# Patient Record
Sex: FEMALE | Race: OTHER | HISPANIC OR LATINO | Employment: FULL TIME | URBAN - NONMETROPOLITAN AREA
[De-identification: names, ages, dates, MRNs, and addresses within clinical notes are randomized per-mention and may not be internally consistent; named-entity substitution may affect disease eponyms.]

---

## 2024-08-02 ENCOUNTER — OFFICE VISIT (OUTPATIENT)
Dept: URGENT CARE | Facility: CLINIC | Age: 29
End: 2024-08-02
Payer: COMMERCIAL

## 2024-08-02 ENCOUNTER — HOSPITAL ENCOUNTER (OUTPATIENT)
Facility: MEDICAL CENTER | Age: 29
End: 2024-08-02
Attending: PHYSICIAN ASSISTANT
Payer: COMMERCIAL

## 2024-08-02 VITALS
DIASTOLIC BLOOD PRESSURE: 52 MMHG | HEIGHT: 65 IN | HEART RATE: 71 BPM | OXYGEN SATURATION: 99 % | BODY MASS INDEX: 26.49 KG/M2 | WEIGHT: 159 LBS | RESPIRATION RATE: 16 BRPM | TEMPERATURE: 98.2 F | SYSTOLIC BLOOD PRESSURE: 116 MMHG

## 2024-08-02 DIAGNOSIS — R10.2 PELVIC PAIN: ICD-10-CM

## 2024-08-02 LAB
APPEARANCE UR: CLEAR
BILIRUB UR STRIP-MCNC: NEGATIVE MG/DL
COLOR UR AUTO: YELLOW
GLUCOSE UR STRIP.AUTO-MCNC: NEGATIVE MG/DL
KETONES UR STRIP.AUTO-MCNC: NEGATIVE MG/DL
LEUKOCYTE ESTERASE UR QL STRIP.AUTO: NEGATIVE
NITRITE UR QL STRIP.AUTO: NEGATIVE
PH UR STRIP.AUTO: 7 [PH] (ref 5–8)
POCT INT CON NEG: NEGATIVE
POCT INT CON POS: POSITIVE
POCT URINE PREGNANCY TEST: NEGATIVE
PROT UR QL STRIP: NEGATIVE MG/DL
RBC UR QL AUTO: NEGATIVE
SP GR UR STRIP.AUTO: 1.01
UROBILINOGEN UR STRIP-MCNC: 0.2 MG/DL

## 2024-08-02 PROCEDURE — 87086 URINE CULTURE/COLONY COUNT: CPT

## 2024-08-02 PROCEDURE — 81002 URINALYSIS NONAUTO W/O SCOPE: CPT | Performed by: PHYSICIAN ASSISTANT

## 2024-08-02 PROCEDURE — 3078F DIAST BP <80 MM HG: CPT | Performed by: PHYSICIAN ASSISTANT

## 2024-08-02 PROCEDURE — 3074F SYST BP LT 130 MM HG: CPT | Performed by: PHYSICIAN ASSISTANT

## 2024-08-02 PROCEDURE — 81025 URINE PREGNANCY TEST: CPT | Performed by: PHYSICIAN ASSISTANT

## 2024-08-02 PROCEDURE — 99213 OFFICE O/P EST LOW 20 MIN: CPT | Performed by: PHYSICIAN ASSISTANT

## 2024-08-02 ASSESSMENT — FIBROSIS 4 INDEX: FIB4 SCORE: 1.04

## 2024-08-02 ASSESSMENT — ENCOUNTER SYMPTOMS
BACK PAIN: 0
NAUSEA: 0
ANOREXIA: 0
DIZZINESS: 0
FLANK PAIN: 0
VOMITING: 0
CHILLS: 0
FEVER: 0
ABDOMINAL PAIN: 1

## 2024-08-02 NOTE — PROGRESS NOTES
Subjective     Valentina WEBB is a 29 y.o. female who presents with UTI (Patient coming in for possible urinary infection)            Pelvic Pain  This is a new problem. Episode onset: 2-3 days. The problem occurs intermittently. The problem has been waxing and waning. Associated symptoms include abdominal pain. Pertinent negatives include no anorexia, chills, fever, nausea, rash, urinary symptoms or vomiting. Associated symptoms comments: Pelvic pain. Nothing aggravates the symptoms. She has tried nothing for the symptoms.     Patient is concerned a possible UTI may be starting. Symptoms from a previous UTI were similar in the past.    Past Medical History:   Diagnosis Date    Elevated sed rate 9/29/2014    Headache(784.0)     Hyperproteinemia 9/29/2014    Hypokalemia 9/29/2014    Hypothyroidism     TSH >300 (9/29/2014)    Menorrhagia 1/2013    Menorrhagia 1/1/2013    Migraine     Pain, joint, multiple sites     Positive NERI (antinuclear antibody) 9/29/2014    NERI 8 (9/29/2015)    Renal insufficiency 9/29/2014       Past Surgical History:   Procedure Laterality Date    [place] LAP,APPENDECTOMY N/A 11/28/2022    Procedure: APPENDECTOMY, LAPAROSCOPIC;  Surgeon: Imelda Garza M.D.;  Location: [facility];  Service: General    MIGEL BY LAPAROSCOPY N/A 02/17/2022    Procedure: LAPAROSCOPIC CHOLESTECTOMY;  Surgeon: Imelda Garza M.D.;  Location: [facility] [place];  Service: General    APPENDECTOMY         Family History   Problem Relation Age of Onset    Ovarian Cancer Mother     Diabetes Maternal Grandmother     Diabetes Maternal Grandfather     No Known Problems Daughter        Patient has no known allergies.    Medications, Allergies, and current problem list reviewed today in Epic    Review of Systems   Constitutional:  Negative for chills, fever and malaise/fatigue.   Gastrointestinal:  Positive for abdominal pain. Negative for anorexia, nausea and vomiting.   Genitourinary:  Positive for pelvic pain.  "Negative for dysuria, flank pain, frequency, hematuria and urgency.        Pelvic pain   Musculoskeletal:  Negative for back pain.   Skin:  Negative for rash.   Neurological:  Negative for dizziness.     All other systems reviewed and are negative.            Objective     /52   Pulse 71   Temp 36.8 °C (98.2 °F) (Temporal)   Resp 16   Ht 1.651 m (5' 5\")   Wt 72.1 kg (159 lb)   SpO2 99%   BMI 26.46 kg/m²      Physical Exam  Constitutional:       General: She is not in acute distress.     Appearance: She is not ill-appearing.   HENT:      Head: Normocephalic and atraumatic.   Eyes:      Conjunctiva/sclera: Conjunctivae normal.   Cardiovascular:      Rate and Rhythm: Normal rate and regular rhythm.   Pulmonary:      Effort: Pulmonary effort is normal. No respiratory distress.      Breath sounds: No stridor. No wheezing.   Abdominal:      General: There is no distension.      Palpations: Abdomen is soft. There is no mass.      Tenderness: There is abdominal tenderness (mild TTP RLQ, LLQ, suprapubic region. No rigidity or guarding.). There is no right CVA tenderness, left CVA tenderness, guarding or rebound. Negative signs include McBurney's sign.   Skin:     General: Skin is warm and dry.   Neurological:      General: No focal deficit present.      Mental Status: She is alert and oriented to person, place, and time.   Psychiatric:         Mood and Affect: Mood normal.         Behavior: Behavior normal.         Thought Content: Thought content normal.         Judgment: Judgment normal.            Lab Results   Component Value Date/Time    POCCOLOR yellow 08/02/2024 03:44 PM    POCAPPEAR clear 08/02/2024 03:44 PM    POCLEUKEST negative 08/02/2024 03:44 PM    POCNITRITE negative 08/02/2024 03:44 PM    POCUROBILIGE 0.2 08/02/2024 03:44 PM    POCPROTEIN negative 08/02/2024 03:44 PM    POCURPH 7.0 08/02/2024 03:44 PM    POCBLOOD negative 08/02/2024 03:44 PM    POCSPGRV 1.015 08/02/2024 03:44 PM    POCKETONES " negative 08/02/2024 03:44 PM    POCBILIRUBIN negative 08/02/2024 03:44 PM    POCGLUCUA negative 08/02/2024 03:44 PM            Hcg- negative              Assessment & Plan        1. Pelvic pain    - POCT Urinalysis  - URINE CULTURE(NEW); Future  - POCT Pregnancy     UA negative. Will culture urine.  No signs of acute abdomen.  Recommend Pelvic US if symptoms persist and urine culture negative.    Differential diagnoses, Supportive care, and indications for immediate follow-up discussed with patient.   Pathogenesis of diagnosis discussed including typical length and natural progression.   Instructed to return to clinic or nearest emergency department for any change in condition, further concerns, or worsening of symptoms.    The patient demonstrated a good understanding and agreed with the treatment plan.    Elizabet Vasquez P.A.-C.

## 2024-08-05 LAB
BACTERIA UR CULT: NORMAL
SIGNIFICANT IND 70042: NORMAL
SITE SITE: NORMAL
SOURCE SOURCE: NORMAL

## 2025-05-17 ENCOUNTER — OFFICE VISIT (OUTPATIENT)
Dept: URGENT CARE | Facility: CLINIC | Age: 30
End: 2025-05-17
Payer: COMMERCIAL

## 2025-05-17 VITALS
TEMPERATURE: 98 F | WEIGHT: 158 LBS | HEIGHT: 65 IN | DIASTOLIC BLOOD PRESSURE: 76 MMHG | HEART RATE: 84 BPM | OXYGEN SATURATION: 97 % | RESPIRATION RATE: 18 BRPM | SYSTOLIC BLOOD PRESSURE: 118 MMHG | BODY MASS INDEX: 26.33 KG/M2

## 2025-05-17 DIAGNOSIS — S61.211A LACERATION OF LEFT INDEX FINGER WITHOUT FOREIGN BODY WITHOUT DAMAGE TO NAIL, INITIAL ENCOUNTER: Primary | ICD-10-CM

## 2025-05-17 PROCEDURE — 12002 RPR S/N/AX/GEN/TRNK2.6-7.5CM: CPT | Mod: F1 | Performed by: PHYSICIAN ASSISTANT

## 2025-05-17 ASSESSMENT — ENCOUNTER SYMPTOMS
TINGLING: 0
WEAKNESS: 0

## 2025-05-17 ASSESSMENT — FIBROSIS 4 INDEX: FIB4 SCORE: 1.32

## 2025-05-17 NOTE — PROGRESS NOTES
"  Subjective:     Valentina WEBB  is a 29 y.o. female who presents for Laceration (Left hand index finger x 1 day)       She presents today for laceration over the left index finger that occurred last night while she was cleaning her bathroom.  States that the injury occurred from a microblade facial tool.  She cleaned the wound last night but presents today as it has not sealed correctly.  No numbness/tingling or weakness over the digit.  Unknown date of last tetanus.       Review of Systems   Skin:         laceration of skin on left index finger   Neurological:  Negative for tingling and weakness.      Allergies[1]  Past Medical History[2]     Objective:   /76   Pulse 84   Temp 36.7 °C (98 °F) (Temporal)   Resp 18   Ht 1.651 m (5' 5\")   Wt 71.7 kg (158 lb)   SpO2 97%   BMI 26.29 kg/m²   Physical Exam  Vitals and nursing note reviewed.   Constitutional:       General: She is not in acute distress.     Appearance: She is not ill-appearing or toxic-appearing.   HENT:      Head: Normocephalic.      Nose: No rhinorrhea.   Eyes:      General: No scleral icterus.     Conjunctiva/sclera: Conjunctivae normal.   Pulmonary:      Effort: Pulmonary effort is normal. No respiratory distress.      Breath sounds: No stridor.   Musculoskeletal:      Cervical back: Neck supple.   Skin:     Comments: examination of the medial aspect of the left index finger does reveal a 2.6 cm laceration, the laceration does extend through the dermal tissues into subcutaneous tissue.  No muscle, tendon, fascial or bony structures are visualized.  No visible foreign bodies.   Neurological:      Mental Status: She is alert and oriented to person, place, and time.   Psychiatric:         Mood and Affect: Mood normal.         Behavior: Behavior normal.         Thought Content: Thought content normal.         Judgment: Judgment normal.             Diagnostic testing: None    Assessment/Plan:     Encounter Diagnoses   Name Primary?    " Laceration of left index finger without foreign body without damage to nail, initial encounter Yes     Procedure: Laceration Repair of left index finger  -Risks including bleeding, nerve damage, infection, and poor cosmetic outcome discussed. Benefits and alternatives discussed.   -Clean technique with sterile instruments and suture used  -Local anesthesia with 1% lidocaine without epinephrine  -Closed with # 2 5-0 Nylon interrupted sutures with good wound approximation  -Polysporin and dressing placed  -Patient tolerated well  -Patient will follow up in 7 days for suture removal      Plan for care for today's complaint includes performing suture repair of the laceration, please see above-stated procedure details for more information.  Suture and dressing care discussed.  Tetanus unable to be given today due to recent power outage caused issues with the tetanus vials, patient instructed to return next week for tdap vaccine.  Return in 7 days for suture removal.    See AVS Instructions below for written guidance provided to patient on after-visit management and care in addition to our verbal discussion during the visit.    Please note that this dictation was created using voice recognition software. I have attempted to correct all errors, but there may be sound-alike, spelling, grammar and possibly content errors that I did not discover before finalizing the note.    Guille Sprague PA-C       [1] No Known Allergies  [2]   Past Medical History:  Diagnosis Date    Elevated sed rate 9/29/2014    Headache(784.0)     Hyperproteinemia 9/29/2014    Hypokalemia 9/29/2014    Hypothyroidism     TSH >300 (9/29/2014)    Menorrhagia 1/2013    Menorrhagia 1/1/2013    Migraine     Pain, joint, multiple sites     Positive NERI (antinuclear antibody) 9/29/2014    NERI 8 (9/29/2015)    Renal insufficiency 9/29/2014

## 2025-05-17 NOTE — PROGRESS NOTES
Standing order for tetanus, vaccine was not available in the urgent care on date of visit    Guille Sprague PA-C

## 2025-05-27 ENCOUNTER — OFFICE VISIT (OUTPATIENT)
Dept: URGENT CARE | Facility: CLINIC | Age: 30
End: 2025-05-27
Payer: COMMERCIAL

## 2025-05-27 VITALS
HEART RATE: 97 BPM | OXYGEN SATURATION: 98 % | BODY MASS INDEX: 26.33 KG/M2 | RESPIRATION RATE: 16 BRPM | SYSTOLIC BLOOD PRESSURE: 118 MMHG | DIASTOLIC BLOOD PRESSURE: 70 MMHG | TEMPERATURE: 97.9 F | WEIGHT: 158 LBS | HEIGHT: 65 IN

## 2025-05-27 DIAGNOSIS — Z48.02 ENCOUNTER FOR REMOVAL OF SUTURES: Primary | ICD-10-CM

## 2025-05-27 ASSESSMENT — ENCOUNTER SYMPTOMS
TINGLING: 0
FEVER: 0
SENSORY CHANGE: 0
VOMITING: 0
NAUSEA: 0
WEAKNESS: 0
FOCAL WEAKNESS: 0
CHILLS: 0

## 2025-05-27 ASSESSMENT — FIBROSIS 4 INDEX: FIB4 SCORE: 1.32

## 2025-05-27 NOTE — PROGRESS NOTES
"Subjective     Valentina WEBB is a 29 y.o. female who presents with Suture / Staple Removal (Left hand index finger )            Suture / Staple Removal  The sutures were placed 7 to 10 days ago (9 days ago). The treatment provided significant relief. Her temperature was unmeasured prior to arrival. There has been no drainage from the wound. There is no redness present. There is no swelling present. There is no pain present. She has no difficulty moving the affected extremity or digit.   2 sutures to left index finger. Last TDAP was 2022.      Past Medical History[1]    Past Surgical History[2]    Family History   Problem Relation Age of Onset    Ovarian Cancer Mother     Diabetes Maternal Grandmother     Diabetes Maternal Grandfather     No Known Problems Daughter        Patient has no known allergies.    Medications, Allergies, and current problem list reviewed today in Epic      Review of Systems   Constitutional:  Negative for chills and fever.   Gastrointestinal:  Negative for nausea and vomiting.   Musculoskeletal:  Negative for joint pain.   Skin:         Laceration to left index    Neurological:  Negative for tingling, sensory change, focal weakness and weakness.     All other systems reviewed and are negative.            Objective     /70   Pulse 97   Temp 36.6 °C (97.9 °F)   Resp 16   Ht 1.651 m (5' 5\")   Wt 71.7 kg (158 lb)   SpO2 98%   BMI 26.29 kg/m²      Physical Exam  Constitutional:       General: She is not in acute distress.     Appearance: She is not ill-appearing.   HENT:      Head: Normocephalic and atraumatic.   Eyes:      Conjunctiva/sclera: Conjunctivae normal.   Cardiovascular:      Rate and Rhythm: Normal rate and regular rhythm.   Pulmonary:      Effort: Pulmonary effort is normal. No respiratory distress.      Breath sounds: No stridor. No wheezing.   Musculoskeletal:        Hands:    Neurological:      General: No focal deficit present.      Mental Status: She is alert " and oriented to person, place, and time.   Psychiatric:         Mood and Affect: Mood normal.         Behavior: Behavior normal.         Thought Content: Thought content normal.         Judgment: Judgment normal.                                  Assessment & Plan  Encounter for removal of sutures       Sutures removed without complication.  Wound healing well.,    Differential diagnoses, Supportive care, and indications for immediate follow-up discussed with patient.   Pathogenesis of diagnosis discussed including typical length and natural progression.   Instructed to return to clinic or nearest emergency department for any change in condition, further concerns, or worsening of symptoms.    The patient demonstrated a good understanding and agreed with the treatment plan.    Elizabet Vasquez P.A.-C.                      [1]   Past Medical History:  Diagnosis Date    Elevated sed rate 9/29/2014    Headache(784.0)     Hyperproteinemia 9/29/2014    Hypokalemia 9/29/2014    Hypothyroidism     TSH >300 (9/29/2014)    Menorrhagia 1/2013    Menorrhagia 1/1/2013    Migraine     Pain, joint, multiple sites     Positive NERI (antinuclear antibody) 9/29/2014    NERI 8 (9/29/2015)    Renal insufficiency 9/29/2014   [2]   Past Surgical History:  Procedure Laterality Date    [place] LAP,APPENDECTOMY N/A 11/28/2022    Procedure: APPENDECTOMY, LAPAROSCOPIC;  Surgeon: Imelda Garza M.D.;  Location: [facility];  Service: General    MIGEL BY LAPAROSCOPY N/A 02/17/2022    Procedure: LAPAROSCOPIC CHOLESTECTOMY;  Surgeon: Imelda Garza M.D.;  Location: [facility];  Service: General    APPENDECTOMY